# Patient Record
Sex: MALE | Race: WHITE | NOT HISPANIC OR LATINO | Employment: UNEMPLOYED | URBAN - METROPOLITAN AREA
[De-identification: names, ages, dates, MRNs, and addresses within clinical notes are randomized per-mention and may not be internally consistent; named-entity substitution may affect disease eponyms.]

---

## 2020-09-21 ENCOUNTER — OFFICE VISIT (OUTPATIENT)
Dept: URGENT CARE | Facility: CLINIC | Age: 55
End: 2020-09-21
Payer: MEDICAID

## 2020-09-21 VITALS
TEMPERATURE: 99 F | OXYGEN SATURATION: 96 % | WEIGHT: 225 LBS | RESPIRATION RATE: 18 BRPM | HEIGHT: 70 IN | BODY MASS INDEX: 32.21 KG/M2 | HEART RATE: 70 BPM | DIASTOLIC BLOOD PRESSURE: 61 MMHG | SYSTOLIC BLOOD PRESSURE: 100 MMHG

## 2020-09-21 DIAGNOSIS — U07.1 COVID-19 VIRUS DETECTED: ICD-10-CM

## 2020-09-21 DIAGNOSIS — U07.1 COVID-19: Primary | ICD-10-CM

## 2020-09-21 LAB
CTP QC/QA: YES
SARS-COV-2 RDRP RESP QL NAA+PROBE: POSITIVE

## 2020-09-21 PROCEDURE — U0002 COVID-19 LAB TEST NON-CDC: HCPCS | Mod: S$GLB,,, | Performed by: PHYSICIAN ASSISTANT

## 2020-09-21 PROCEDURE — U0002: ICD-10-PCS | Mod: S$GLB,,, | Performed by: PHYSICIAN ASSISTANT

## 2020-09-21 PROCEDURE — 99203 OFFICE O/P NEW LOW 30 MIN: CPT | Mod: S$GLB,,, | Performed by: PHYSICIAN ASSISTANT

## 2020-09-21 PROCEDURE — 99203 PR OFFICE/OUTPT VISIT, NEW, LEVL III, 30-44 MIN: ICD-10-PCS | Mod: S$GLB,,, | Performed by: PHYSICIAN ASSISTANT

## 2020-09-21 RX ORDER — ROSUVASTATIN CALCIUM 10 MG/1
10 TABLET, COATED ORAL DAILY
COMMUNITY

## 2020-09-21 RX ORDER — ASPIRIN 325 MG
325 TABLET ORAL DAILY
COMMUNITY

## 2020-09-21 RX ORDER — AMLODIPINE BESYLATE 10 MG/1
10 TABLET ORAL DAILY
COMMUNITY

## 2020-09-21 RX ORDER — METOPROLOL SUCCINATE 100 MG/1
100 TABLET, EXTENDED RELEASE ORAL DAILY
COMMUNITY

## 2020-09-21 RX ORDER — LISINOPRIL 10 MG/1
10 TABLET ORAL DAILY
COMMUNITY

## 2020-09-21 NOTE — PATIENT INSTRUCTIONS
Your test was POSITIVE for COVID-19 (coronavirus).       Please isolate yourself at home.  You may leave home and/or return to work once the following conditions are met:    If you were not hospitalized and are not severely immunocompromised*:   More than 10 days since symptoms first appeared AND   More than 24 hours fever free without medications AND   Symptoms have improved     If you were hospitalized OR are severely immunocompromised*:   More than 20 days since symptoms first appeared   More than 24 hrs hours fever free without medications   Symptoms have improved    If you had no symptoms but tested postivepositive:   More than 10 days since the date of the first positive test (20 days if severely immunocompromised).   If you develop symptoms, then use the guidelines above.     *Definition of severely immunocompromised:  - Current chemotherapy for cancer  - Untreated HIV with CD4 count less than 200  - Combined primary immunodeficiency disorder  - Prednisone more than 20 mg per day for more than 14 days  - Post-transplant patients    Additional instructions:   Separate yourself from other people and animals in your home.   Call ahead before visiting your doctor.   Wear a facemask when around others.   Cover your coughs and sneezes.   Wash your hands often with soap and water; hand  can be used, too.   Avoid sharing personal household items.   Wipe down surfaces used daily.   Monitor your symptoms. Seek prompt medical attention if your illness is worsening (e.g., difficulty breathing).    Before seeking care, call your healthcare provider.   If you have a medical emergency and need to call 911, notify the dispatch personnel that you have, or are being evaluated for COVID-19. If possible, put on a facemask before emergency medical services arrive.      Contact Tracing    As one of the next steps, you will receive a call or text from the [place] Department of Health ([facility]) COVID-19  Tracing Team. See the contact information below so you know not to ignore the health departments call. It is important that you contact them back immediately so they can help.      Contact Tracer Number:  [phone]  Caller ID for most carriers: [facility]     What is contact tracing?  · Contact tracing is a process that helps identify everyone who has been in close contact with an infected person. Contact tracers let those people know they may have been exposed and guide them on next steps. Confidentiality is important for everyone; no one will be told who may have exposed them to the virus.  · Your involvement is important. The more we know about where and how this virus is spreading, the better chance we have at stopping it from spreading further.  What does exposure mean?  · Exposure means you have been within 6 feet for more than 15 minutes with a person who has or had COVID-19.  What kind of questions do the contact tracers ask?  · A contact tracer will confirm your basic contact information including name, address, phone number, and next of kin, as well as asking about any symptoms you may have had. Theyll also ask you how you think you may have gotten sick, such as places where you may have been exposed to the virus, and people you were with. Those names will never be shared with anyone outside of that call, and will only be used to help trace and stop the spread of the virus.   I have privacy concerns. How will the state use my information?  · Your privacy about your health is important. All calls are completed using call centers that use the appropriate health privacy protection measures (HIPAA compliance), meaning that your patient information is safe. No one will ever ask you any questions related to immigration status. Your health comes first.   Do I have to participate?  · You do not have to participate, but we strongly encourage you to. Contact tracing can help us catch and control new  outbreaks as theyre developing to keep your friends and family safe.   What if I dont hear from anyone?  · If you dont receive a call within 24 hours, you can call the number above right away to inquire about your status. That line is open from 8:00 am - 8:00 p.m., 7 days a week.  Contact tracing saves lives! Together, we have the power to beat this virus and keep our loved ones and neighbors safe.    For more information see CDC link below.      https://www.cdc.gov/coronavirus/2019-ncov/hcp/guidance-prevent-spread.html#precautions        Sources:  [facility], [place] [facility] and [facility]           Sincerely,     Corina Mahmood PA-C

## 2020-09-21 NOTE — PROGRESS NOTES
"Subjective:       Patient ID: Umair Ascencio is a 55 y.o. male.    Vitals:  height is 5' 10" (1.778 m) and weight is 102.1 kg (225 lb). His temperature is 99.1 °F (37.3 °C). His blood pressure is 100/61 and his pulse is 70. His respiration is 18 and oxygen saturation is 96%.     Chief Complaint: URI    Patient reports congestion, fatigue, weakness and cough for the last week.     URI   This is a new problem. The problem has been unchanged. The maximum temperature recorded prior to his arrival was 100.4 - 100.9 F. Associated symptoms include coughing. Pertinent negatives include no congestion, ear pain, nausea, rash, sinus pain, sore throat, vomiting or wheezing. He has tried acetaminophen for the symptoms. The treatment provided no relief.       Constitution: Positive for chills, fatigue and fever. Negative for sweating.   HENT: Positive for sinus pressure. Negative for ear pain, congestion, sinus pain, sore throat and voice change.    Neck: Negative for painful lymph nodes.   Eyes: Negative for eye redness.   Respiratory: Positive for cough. Negative for chest tightness, sputum production, bloody sputum, COPD, shortness of breath, stridor, wheezing and asthma.    Gastrointestinal: Negative for nausea and vomiting.   Musculoskeletal: Negative for muscle ache.   Skin: Negative for rash.   Allergic/Immunologic: Negative for seasonal allergies and asthma.   Hematologic/Lymphatic: Negative for swollen lymph nodes.       Objective:      Physical Exam   Constitutional: He is oriented to person, place, and time. He appears well-developed. He is cooperative. He does not appear ill. No distress.   HENT:   Head: Normocephalic and atraumatic.   Ears:   Right Ear: External ear normal.   Left Ear: External ear normal.   Nose: Nose normal.   Mouth/Throat: Oropharynx is clear and moist.   Eyes: Conjunctivae, EOM and lids are normal.   Neck: Trachea normal, full passive range of motion without pain and phonation normal. Neck " supple.   Pulmonary/Chest: Effort normal. No respiratory distress.   Musculoskeletal: Normal range of motion.   Neurological: He is alert and oriented to person, place, and time.   Skin: Skin is warm, dry, intact and not diaphoretic. Psychiatric: His speech is normal and behavior is normal. Judgment and thought content normal.   Nursing note and vitals reviewed.        Assessment:       1. COVID-19        Office Visit on 09/21/2020   Component Date Value Ref Range Status    POC Rapid COVID 09/21/2020 Positive* Negative Final     Acceptable 09/21/2020 Yes   Final     Plan:       Entered room with PPE on.  Patient does not appear to be in respiratory distress.  And he denies shortness of breath.  ER precautions and quarantine precautions discussed.    COVID-19  -     POCT COVID-19 Rapid Screening      Patient Instructions   Your test was POSITIVE for COVID-19 (coronavirus).       Please isolate yourself at home.  You may leave home and/or return to work once the following conditions are met:    If you were not hospitalized and are not severely immunocompromised*:   More than 10 days since symptoms first appeared AND   More than 24 hours fever free without medications AND   Symptoms have improved     If you were hospitalized OR are severely immunocompromised*:   More than 20 days since symptoms first appeared   More than 24 hrs hours fever free without medications   Symptoms have improved    If you had no symptoms but tested postivepositive:   More than 10 days since the date of the first positive test (20 days if severely immunocompromised).   If you develop symptoms, then use the guidelines above.     *Definition of severely immunocompromised:  - Current chemotherapy for cancer  - Untreated HIV with CD4 count less than 200  - Combined primary immunodeficiency disorder  - Prednisone more than 20 mg per day for more than 14 days  - Post-transplant patients    Additional instructions:   Separate  yourself from other people and animals in your home.   Call ahead before visiting your doctor.   Wear a facemask when around others.   Cover your coughs and sneezes.   Wash your hands often with soap and water; hand  can be used, too.   Avoid sharing personal household items.   Wipe down surfaces used daily.   Monitor your symptoms. Seek prompt medical attention if your illness is worsening (e.g., difficulty breathing).    Before seeking care, call your healthcare provider.   If you have a medical emergency and need to call 911, notify the dispatch personnel that you have, or are being evaluated for COVID-19. If possible, put on a facemask before emergency medical services arrive.      Contact Tracing    As one of the next steps, you will receive a call or text from the [place] [facility] ([facility]) COVID-19 Tracing Team. See the contact information below so you know not to ignore the health departments call. It is important that you contact them back immediately so they can help.      Contact Tracer Number:  [phone]  Caller ID for most carriers: [facility]     What is contact tracing?  · Contact tracing is a process that helps identify everyone who has been in close contact with an infected person. Contact tracers let those people know they may have been exposed and guide them on next steps. Confidentiality is important for everyone; no one will be told who may have exposed them to the virus.  · Your involvement is important. The more we know about where and how this virus is spreading, the better chance we have at stopping it from spreading further.  What does exposure mean?  · Exposure means you have been within 6 feet for more than 15 minutes with a person who has or had COVID-19.  What kind of questions do the contact tracers ask?  · A contact tracer will confirm your basic contact information including name, address, phone number, and next of kin, as well as asking about  any symptoms you may have had. Theyll also ask you how you think you may have gotten sick, such as places where you may have been exposed to the virus, and people you were with. Those names will never be shared with anyone outside of that call, and will only be used to help trace and stop the spread of the virus.   I have privacy concerns. How will the state use my information?  · Your privacy about your health is important. All calls are completed using call centers that use the appropriate health privacy protection measures (HIPAA compliance), meaning that your patient information is safe. No one will ever ask you any questions related to immigration status. Your health comes first.   Do I have to participate?  · You do not have to participate, but we strongly encourage you to. Contact tracing can help us catch and control new outbreaks as theyre developing to keep your friends and family safe.   What if I dont hear from anyone?  · If you dont receive a call within 24 hours, you can call the number above right away to inquire about your status. That line is open from 8:00 am - 8:00 p.m., 7 days a week.  Contact tracing saves lives! Together, we have the power to beat this virus and keep our loved ones and neighbors safe.    For more information see CDC link below.      https://www.cdc.gov/coronavirus/2019-ncov/hcp/guidance-prevent-spread.html#precautions        Sources:  [facility], [place] [facility] and [facility]           Sincerely,     Corina Mahmood PA-C

## 2020-12-02 ENCOUNTER — OFFICE VISIT (OUTPATIENT)
Dept: UROLOGY | Facility: CLINIC | Age: 55
End: 2020-12-02
Payer: MEDICAID

## 2020-12-02 VITALS
RESPIRATION RATE: 18 BRPM | DIASTOLIC BLOOD PRESSURE: 78 MMHG | WEIGHT: 240 LBS | OXYGEN SATURATION: 98 % | TEMPERATURE: 98 F | SYSTOLIC BLOOD PRESSURE: 130 MMHG | HEIGHT: 70 IN | HEART RATE: 64 BPM | BODY MASS INDEX: 34.36 KG/M2

## 2020-12-02 DIAGNOSIS — R53.82 CHRONIC FATIGUE: ICD-10-CM

## 2020-12-02 DIAGNOSIS — R39.198 SLOW URINARY STREAM: ICD-10-CM

## 2020-12-02 DIAGNOSIS — R63.5 UNEXPLAINED WEIGHT GAIN: ICD-10-CM

## 2020-12-02 DIAGNOSIS — R79.89 LOW TESTOSTERONE IN MALE: ICD-10-CM

## 2020-12-02 PROCEDURE — 99999 PR PBB SHADOW E&M-EST. PATIENT-LVL III: CPT | Mod: PBBFAC,,, | Performed by: UROLOGY

## 2020-12-02 PROCEDURE — 99999 PR PBB SHADOW E&M-EST. PATIENT-LVL III: ICD-10-PCS | Mod: PBBFAC,,, | Performed by: UROLOGY

## 2020-12-02 PROCEDURE — 99204 OFFICE O/P NEW MOD 45 MIN: CPT | Mod: S$PBB,,, | Performed by: UROLOGY

## 2020-12-02 PROCEDURE — 99204 PR OFFICE/OUTPT VISIT, NEW, LEVL IV, 45-59 MIN: ICD-10-PCS | Mod: S$PBB,,, | Performed by: UROLOGY

## 2020-12-02 PROCEDURE — 99213 OFFICE O/P EST LOW 20 MIN: CPT | Mod: PBBFAC,PO | Performed by: UROLOGY

## 2020-12-02 NOTE — PROGRESS NOTES
Subjective:       Patient ID: Umair Ascencio is a 55 y.o. male.    Chief Complaint: No chief complaint on file.    Mr. Ascencio presents the office with referral from Dr. Mares for treatment of low testosterone.  The patient had low testosterone levels in Dr. Mares office and he was referred for evaluation and treatment.  He has had an increasing fatigue an unexplained weight gain even though he has been exercising daily.  He did have a history of emergency open-heart surgery approximately 3 years ago after angioplasty and attempted stent placement cause disruption of coronary vessel.  Patient has done well since then but is now starting to gain weight and have trouble with energy even though he is exercising and eating right daily.    Other  This is a chronic problem. The current episode started more than 1 year ago. The problem occurs constantly. The problem has been gradually worsening. Associated symptoms include fatigue. Pertinent negatives include no abdominal pain, anorexia, arthralgias, change in bowel habit, chest pain, chills, congestion, coughing, diaphoresis, fever, headaches, joint swelling, myalgias, nausea, neck pain, numbness, rash, sore throat, swollen glands, urinary symptoms, vertigo, visual change, vomiting or weakness. Nothing aggravates the symptoms. He has tried nothing for the symptoms.     Review of Systems   Constitutional: Positive for fatigue and unexpected weight change. Negative for activity change, appetite change, chills, diaphoresis and fever.   HENT: Negative for congestion, hearing loss, sinus pressure, sore throat and trouble swallowing.    Eyes: Negative for photophobia, pain, discharge and visual disturbance.   Respiratory: Negative for apnea, cough and shortness of breath.    Cardiovascular: Negative for chest pain, palpitations and leg swelling.   Gastrointestinal: Negative for abdominal distention, abdominal pain, anal bleeding, anorexia, blood in stool, change in bowel habit,  constipation, diarrhea, nausea, rectal pain and vomiting.   Endocrine: Negative for cold intolerance, heat intolerance, polydipsia, polyphagia and polyuria.   Genitourinary: Negative for decreased urine volume, difficulty urinating, discharge, dysuria, enuresis, flank pain, frequency, genital sores, hematuria, penile pain, penile swelling, scrotal swelling, testicular pain and urgency.   Musculoskeletal: Negative for arthralgias, back pain, joint swelling, myalgias and neck pain.   Skin: Negative for color change, pallor, rash and wound.   Allergic/Immunologic: Negative for environmental allergies, food allergies and immunocompromised state.   Neurological: Negative for dizziness, vertigo, seizures, weakness, numbness and headaches.   Hematological: Negative for adenopathy. Does not bruise/bleed easily.   Psychiatric/Behavioral: Negative.        Objective:      Physical Exam  Vitals signs and nursing note reviewed.   Constitutional:       General: He is not in acute distress.     Appearance: Normal appearance. He is well-developed. He is obese. He is not ill-appearing, toxic-appearing or diaphoretic.   HENT:      Head: Normocephalic and atraumatic.      Right Ear: There is no impacted cerumen.      Left Ear: There is no impacted cerumen.      Nose: Nose normal.      Mouth/Throat:      Mouth: Mucous membranes are moist.      Pharynx: Oropharynx is clear. No oropharyngeal exudate or posterior oropharyngeal erythema.   Eyes:      General: No scleral icterus.        Right eye: No discharge.         Left eye: No discharge.      Extraocular Movements: Extraocular movements intact.      Conjunctiva/sclera: Conjunctivae normal.      Pupils: Pupils are equal, round, and reactive to light.   Neck:      Musculoskeletal: Normal range of motion and neck supple.   Cardiovascular:      Rate and Rhythm: Normal rate and regular rhythm.      Heart sounds: Normal heart sounds. No murmur. No friction rub. No gallop.    Pulmonary:       Effort: Pulmonary effort is normal.      Breath sounds: Normal breath sounds.   Abdominal:      General: Bowel sounds are normal. There is no distension.      Palpations: Abdomen is soft. There is no mass.      Tenderness: There is no abdominal tenderness. There is no right CVA tenderness, left CVA tenderness, guarding or rebound.      Hernia: No hernia is present.   Genitourinary:     Penis: Normal.    Musculoskeletal: Normal range of motion.         General: No swelling, tenderness, deformity or signs of injury.      Right lower leg: No edema.      Left lower leg: No edema.   Skin:     General: Skin is warm and dry.      Capillary Refill: Capillary refill takes less than 2 seconds.      Coloration: Skin is not jaundiced or pale.      Findings: No bruising, erythema, lesion or rash.   Neurological:      Mental Status: He is alert and oriented to person, place, and time.      Cranial Nerves: No cranial nerve deficit.      Sensory: No sensory deficit.      Motor: No weakness.      Coordination: Coordination normal.      Gait: Gait normal.      Deep Tendon Reflexes: Reflexes are normal and symmetric. Reflexes normal.   Psychiatric:         Mood and Affect: Mood normal.         Behavior: Behavior normal.         Thought Content: Thought content normal.         Judgment: Judgment normal.         Assessment:       1. Chronic fatigue    2. Unexplained weight gain    3. Low testosterone in male    4. Slow urinary stream        Plan:           Patient Instructions   Check PSA, testosterone and estrogen level in the morning.  Call results  Treat appropriately once labs were performed and notify patient of medications.  Repeat labs in 3 months as required if treatment is initiated and follow-up in 3 months to evaluate affect.

## 2020-12-02 NOTE — LETTER
December 2, 2020      Jaquan Mares MD  [address] [place] [id number]           [facility] - Urology - [address] [address]  Phone: [phone]  Fax: [phone]          Patient: Umair Ascencio   MR Number: [id number]   YOB: 1965   Date of Visit: 12/2/2020       Dear Dr. Jaquan Mares:    Thank you for referring Umair Ascencio to me for evaluation. Attached you will find relevant portions of my assessment and plan of care.    If you have questions, please do not hesitate to call me. I look forward to following Umair Ascencio along with you.    Please for me copies of all testosterone and PSA labs available.    Sincerely,    Ravi Guzman MD    Enclosure  CC:  No Recipients    If you would like to receive this communication electronically, please contact [email or web address] or [phone] to request more information on [facility] Link access.    For providers and/or their staff who would like to refer a patient to [facility], please contact us through our one-stop-shop provider referral line, [facility], at [phone].    If you feel you have received this communication in error or would no longer like to receive these types of communications, please e-mail [email or web address]

## 2020-12-02 NOTE — PATIENT INSTRUCTIONS
Check PSA, testosterone and estrogen level in the morning.  Call results  Treat appropriately once labs were performed and notify patient of medications.  Repeat labs in 3 months as required if treatment is initiated and follow-up in 3 months to evaluate affect.

## 2020-12-21 ENCOUNTER — PATIENT MESSAGE (OUTPATIENT)
Dept: UROLOGY | Facility: CLINIC | Age: 55
End: 2020-12-21

## 2020-12-22 ENCOUNTER — PATIENT MESSAGE (OUTPATIENT)
Dept: UROLOGY | Facility: CLINIC | Age: 55
End: 2020-12-22

## 2021-03-02 ENCOUNTER — OFFICE VISIT (OUTPATIENT)
Dept: UROLOGY | Facility: CLINIC | Age: 56
End: 2021-03-02
Payer: MEDICAID

## 2021-03-02 VITALS
SYSTOLIC BLOOD PRESSURE: 148 MMHG | HEART RATE: 84 BPM | BODY MASS INDEX: 34.4 KG/M2 | DIASTOLIC BLOOD PRESSURE: 94 MMHG | HEIGHT: 70 IN | WEIGHT: 240.31 LBS

## 2021-03-02 DIAGNOSIS — R53.82 CHRONIC FATIGUE: ICD-10-CM

## 2021-03-02 DIAGNOSIS — R79.89 LOW TESTOSTERONE IN MALE: Primary | ICD-10-CM

## 2021-03-02 DIAGNOSIS — R86.1 ABNORMAL LEVEL OF HORMONES IN SPECIMENS FROM MALE GENITAL ORGANS: ICD-10-CM

## 2021-03-02 PROCEDURE — 99999 PR PBB SHADOW E&M-EST. PATIENT-LVL III: ICD-10-PCS | Mod: PBBFAC,,, | Performed by: UROLOGY

## 2021-03-02 PROCEDURE — 99213 OFFICE O/P EST LOW 20 MIN: CPT | Mod: PBBFAC,PO | Performed by: UROLOGY

## 2021-03-02 PROCEDURE — 99999 PR PBB SHADOW E&M-EST. PATIENT-LVL III: CPT | Mod: PBBFAC,,, | Performed by: UROLOGY

## 2021-03-02 PROCEDURE — 99215 PR OFFICE/OUTPT VISIT, EST, LEVL V, 40-54 MIN: ICD-10-PCS | Mod: S$PBB,,, | Performed by: UROLOGY

## 2021-03-02 PROCEDURE — 99215 OFFICE O/P EST HI 40 MIN: CPT | Mod: S$PBB,,, | Performed by: UROLOGY

## 2021-03-02 RX ORDER — TESTOSTERONE 30 MG/1.5ML
60 SOLUTION TOPICAL DAILY
Qty: 2 BOTTLE | Refills: 5 | Status: SHIPPED | OUTPATIENT
Start: 2021-03-02 | End: 2023-08-10

## 2021-03-17 ENCOUNTER — TELEPHONE (OUTPATIENT)
Dept: PHARMACY | Facility: CLINIC | Age: 56
End: 2021-03-17

## 2021-04-15 ENCOUNTER — PATIENT MESSAGE (OUTPATIENT)
Dept: RESEARCH | Facility: HOSPITAL | Age: 56
End: 2021-04-15

## 2022-02-16 ENCOUNTER — PATIENT MESSAGE (OUTPATIENT)
Dept: RESEARCH | Facility: HOSPITAL | Age: 57
End: 2022-02-16
Payer: MEDICAID

## 2023-08-03 ENCOUNTER — OFFICE VISIT (OUTPATIENT)
Dept: UROLOGY | Facility: CLINIC | Age: 58
End: 2023-08-03
Payer: COMMERCIAL

## 2023-08-03 VITALS
HEIGHT: 70 IN | SYSTOLIC BLOOD PRESSURE: 86 MMHG | DIASTOLIC BLOOD PRESSURE: 57 MMHG | BODY MASS INDEX: 31.55 KG/M2 | HEART RATE: 54 BPM | WEIGHT: 220.38 LBS

## 2023-08-03 DIAGNOSIS — R79.89 LOW TESTOSTERONE IN MALE: Primary | ICD-10-CM

## 2023-08-03 PROCEDURE — 99214 OFFICE O/P EST MOD 30 MIN: CPT | Mod: S$GLB,,, | Performed by: UROLOGY

## 2023-08-03 PROCEDURE — 1159F MED LIST DOCD IN RCRD: CPT | Mod: CPTII,S$GLB,, | Performed by: UROLOGY

## 2023-08-03 PROCEDURE — 3074F SYST BP LT 130 MM HG: CPT | Mod: CPTII,S$GLB,, | Performed by: UROLOGY

## 2023-08-03 PROCEDURE — 3074F PR MOST RECENT SYSTOLIC BLOOD PRESSURE < 130 MM HG: ICD-10-PCS | Mod: CPTII,S$GLB,, | Performed by: UROLOGY

## 2023-08-03 PROCEDURE — 4010F ACE/ARB THERAPY RXD/TAKEN: CPT | Mod: CPTII,S$GLB,, | Performed by: UROLOGY

## 2023-08-03 PROCEDURE — 3078F DIAST BP <80 MM HG: CPT | Mod: CPTII,S$GLB,, | Performed by: UROLOGY

## 2023-08-03 PROCEDURE — 99999 PR PBB SHADOW E&M-EST. PATIENT-LVL III: CPT | Mod: PBBFAC,,, | Performed by: UROLOGY

## 2023-08-03 PROCEDURE — 3008F BODY MASS INDEX DOCD: CPT | Mod: CPTII,S$GLB,, | Performed by: UROLOGY

## 2023-08-03 PROCEDURE — 99214 PR OFFICE/OUTPT VISIT, EST, LEVL IV, 30-39 MIN: ICD-10-PCS | Mod: S$GLB,,, | Performed by: UROLOGY

## 2023-08-03 PROCEDURE — 3078F PR MOST RECENT DIASTOLIC BLOOD PRESSURE < 80 MM HG: ICD-10-PCS | Mod: CPTII,S$GLB,, | Performed by: UROLOGY

## 2023-08-03 PROCEDURE — 3008F PR BODY MASS INDEX (BMI) DOCUMENTED: ICD-10-PCS | Mod: CPTII,S$GLB,, | Performed by: UROLOGY

## 2023-08-03 PROCEDURE — 1159F PR MEDICATION LIST DOCUMENTED IN MEDICAL RECORD: ICD-10-PCS | Mod: CPTII,S$GLB,, | Performed by: UROLOGY

## 2023-08-03 PROCEDURE — 4010F PR ACE/ARB THEARPY RXD/TAKEN: ICD-10-PCS | Mod: CPTII,S$GLB,, | Performed by: UROLOGY

## 2023-08-03 PROCEDURE — 99999 PR PBB SHADOW E&M-EST. PATIENT-LVL III: ICD-10-PCS | Mod: PBBFAC,,, | Performed by: UROLOGY

## 2023-08-03 NOTE — PROGRESS NOTES
[facility]rology   Clinic Note    SUBJECTIVE:     Chief Complaint   Patient presents with    Benign Prostatic Hypertrophy       Referral from: No ref. provider found.    History of Present Illness:  Umair Ascencio is a 58 y.o. male who presents to clinic for evaluation for hypogonadism, low testosterone.    Patient is here for evaluation of hypogonadism.  He reports that he has low energy and low libido.  This has been persistent for some time.  He also reports erectile dysfunction.  He had low testosterone drawn by his PCP however these labs are not available in our system.  He desires testosterone replacement therapy.  He denies a personal family history of prostate cancer.     Patient endorses no additional complaints at this time.    History reviewed. No pertinent past medical history.    History reviewed. No pertinent surgical history.    History reviewed. No pertinent family history.    Social History     Tobacco Use    Smoking status: Never    Smokeless tobacco: Never   Substance Use Topics    Alcohol use: Not Currently    Drug use: Not Currently       Current Outpatient Medications on File Prior to Visit   Medication Sig Dispense Refill    amLODIPine (NORVASC) 10 MG tablet Take 10 mg by mouth once daily.      aspirin 325 MG tablet Take 325 mg by mouth once daily.      lisinopriL 10 MG tablet Take 10 mg by mouth once daily.      metoprolol succinate (TOPROL-XL) 100 MG 24 hr tablet Take 100 mg by mouth once daily.      rosuvastatin (CRESTOR) 10 MG tablet Take 10 mg by mouth once daily.      testosterone (AXIRON) 30 mg/actuation (1.5 mL) SlPm Place 60 mg onto the skin once daily. 2 Bottle 5    testosterone (AXIRON) 30 mg/actuation (1.5 mL) SlPm Place 60 mg (2 pumps) onto the skin once daily. 180 mL 5     No current facility-administered medications on file prior to visit.       Review of patient's allergies indicates:  No Known Allergies    Review of Systems:  A review of 10+ systems was conducted with  "pertinent positive and negative findings documented in HPI with all other systems reviewed and negative.    OBJECTIVE:     Estimated body mass index is 31.62 kg/m² as calculated from the following:    Height as of this encounter: 5' 10" (1.778 m).    Weight as of this encounter: 100 kg (220 lb 6.4 oz).    Vital Signs (Most Recent)  Vitals:    08/03/23 1337   BP: (!) 86/57   Pulse: (!) 54       Physical Exam:  GENERAL: patient sitting comfortably  HEENT: normocephalic  NECK: supple, no JVD  PULM: normal chest rise, no increased WOB  HEART: non-diaphoretic  ABDO: soft, nondistended, nontender  BACK: no CVA tenderness bilaterally  SKIN: warm, dry, well perfused  EXT: no bruising or edema  NEURO: grossly normal with no focal deficits  PSYCH: appropriate mood and affect    Genitourinary Exam:  deferred    Lab Results   Component Value Date    BUN 13 03/09/2018    CREATININE 1.00 03/09/2018    WBC 6.22 03/09/2018    HGB 18.0 03/09/2018    HCT 52.8 03/09/2018     03/09/2018    AST 24 03/09/2018    ALT 30 03/09/2018    ALKPHOS 88 03/09/2018    ALBUMIN 4.5 03/09/2018        Imaging:  I have personally reviewed all relevant imaging studies.    No results found for this or any previous visit (from the past 2160 hour(s)).  No results found for this or any previous visit (from the past 2160 hour(s)).  No image results found.       PSA:  No results found for: "PSA", "PSADIAG", "PSATOTAL", "PSAFREE"    Testosterone:  No results found for: "TOTALTESTOST", "TESTOSTERONE"     ASSESSMENT     1. Low testosterone in male        PLAN:     Hypogonadism in a Male (Low Testosterone)    - We discussed the diagnosis of testosterone deficiency, as below 300 ng/dL on two early morning testosterone blood draws. Patient is below this level. We discussed adjunctive lab testing, including LH, PSA, Hgb/Hct. We discussed the risks and benefits of testosterone replacement therapy. We discussed that if trying to conceive, patient should not be " started on exogenous testosterone therapy. Alternative treatments and adjunctive lab testing were discussed. All questions were answered.    - we will obtain labs to confirm that his testosterone is indeed low.  If it is low we will replace with intramuscular testosterone.  I will transition him to the care of the nurse practitioner when she starts in October.    Chris Garcia MD  [facility]  [facility]  [facility]    Disclaimer: This note has been generated using voice-recognition software. There may be typographical errors that have been missed during proof-reading.

## 2023-08-06 DIAGNOSIS — R79.89 LOW TESTOSTERONE IN MALE: Primary | ICD-10-CM

## 2023-08-10 DIAGNOSIS — E29.1 HYPOGONADISM IN MALE: Primary | ICD-10-CM

## 2023-08-10 RX ORDER — TESTOSTERONE CYPIONATE 200 MG/ML
200 INJECTION, SOLUTION INTRAMUSCULAR
Qty: 13 ML | Refills: 0 | Status: SHIPPED | OUTPATIENT
Start: 2023-08-10 | End: 2023-08-14 | Stop reason: SDUPTHER

## 2023-08-14 ENCOUNTER — PATIENT MESSAGE (OUTPATIENT)
Dept: UROLOGY | Facility: CLINIC | Age: 58
End: 2023-08-14
Payer: COMMERCIAL

## 2023-08-14 DIAGNOSIS — E29.1 HYPOGONADISM IN MALE: ICD-10-CM

## 2023-08-14 RX ORDER — TESTOSTERONE CYPIONATE 200 MG/ML
200 INJECTION, SOLUTION INTRAMUSCULAR
Qty: 13 ML | Refills: 0 | Status: SHIPPED | OUTPATIENT
Start: 2023-08-14 | End: 2024-02-15 | Stop reason: SDUPTHER

## 2023-10-09 ENCOUNTER — TELEPHONE (OUTPATIENT)
Dept: UROLOGY | Facility: CLINIC | Age: 58
End: 2023-10-09
Payer: COMMERCIAL

## 2023-10-09 ENCOUNTER — NURSE TRIAGE (OUTPATIENT)
Dept: ADMINISTRATIVE | Facility: CLINIC | Age: 58
End: 2023-10-09
Payer: COMMERCIAL

## 2023-10-09 NOTE — TELEPHONE ENCOUNTER
Pt calling states he has been on testosterone replacement for about 2 months now, last Wednesday he took some fluid pills then worked out and was dizzy and vomited. Was told to increase fluid intake by cardiologist. States he improved but is still lightheaded when he gets up. Per protocol advised to GO TO OFFICE NOW. verbalized understanding, states he will call his providers. Advised if unable to get in then to go to [facility]. verbalized understanding. Denies any further questions or concerns at this time, advised to call back if they have any that come up. Advised pt to call back with any other concerns or worsening symptoms. Verbalized understanding and will route message to provider.     Reason for Disposition   Lightheadedness (dizziness) present now, after 2 hours of rest and fluids    Additional Information   Negative: SEVERE difficulty breathing (e.g., struggling for each breath, speaks in single words)   Negative: Shock suspected (e.g., cold/pale/clammy skin, too weak to stand, low BP, rapid pulse)   Negative: Difficult to awaken or acting confused (e.g., disoriented, slurred speech)   Negative: Fainted, and still feels dizzy afterwards   Negative: Overdose (accidental or intentional) of medications   Negative: New neurologic deficit that is present now: * Weakness of the face, arm, or leg on one side of the body * Numbness of the face, arm, or leg on one side of the body * Loss of speech or garbled speech   Negative: Heart beating < 50 beats per minute OR > 140 beats per minute   Negative: Sounds like a life-threatening emergency to the triager   Negative: SEVERE dizziness (e.g., unable to stand, requires support to walk, feels like passing out now)   Negative: SEVERE headache or neck pain   Negative: Spinning or tilting sensation (vertigo) present now and one or more stroke risk factors (i.e., hypertension, diabetes mellitus, prior stroke/TIA, heart attack, age over 60) (Exception: Prior physician evaluation  for this AND no different/worse than usual.)   Negative: Neurologic deficit that was brief (now gone), ANY of the following:* Weakness of the face, arm, or leg on one side of the body* Numbness of the face, arm, or leg on one side of the body* Loss of speech or garbled speech   Negative: Loss of vision or double vision  (Exception: Similar to previous migraines.)   Negative: Extra heartbeats, irregular heart beating, or heart is beating very fast (i.e., 'palpitations')   Negative: Difficulty breathing   Negative: Drinking very little and dehydration suspected (e.g., no urine > 12 hours, very dry mouth, very lightheaded)   Negative: Follows bleeding (e.g., stomach, rectum, vagina)  (Exception: Became dizzy from sight of small amount blood.)   Negative: Patient sounds very sick or weak to the triager    Protocols used: Dizziness-A-OH

## 2023-10-09 NOTE — TELEPHONE ENCOUNTER
Pt stated that he is still a little light headed but has an appt today for ENT to check and see if there is anything causing the light headedness. I voiced my understanding and advised to pt to reach back out if symptoms continued. Pt confirmed.

## 2023-12-12 DIAGNOSIS — E29.1 HYPOGONADISM IN MALE: ICD-10-CM

## 2023-12-12 RX ORDER — TESTOSTERONE CYPIONATE 200 MG/ML
200 INJECTION, SOLUTION INTRAMUSCULAR
Qty: 13 ML | Refills: 0 | Status: CANCELLED | OUTPATIENT
Start: 2023-12-12 | End: 2024-06-11

## 2024-01-25 ENCOUNTER — TELEPHONE (OUTPATIENT)
Dept: UROLOGY | Facility: CLINIC | Age: 59
End: 2024-01-25
Payer: COMMERCIAL

## 2024-01-25 NOTE — TELEPHONE ENCOUNTER
Left vm for pt stating upcoming appt has been rescheduled on 2/8 due to provider being out of office. Call back number left and new appointment letter sent out.

## 2024-02-15 ENCOUNTER — OFFICE VISIT (OUTPATIENT)
Dept: UROLOGY | Facility: CLINIC | Age: 59
End: 2024-02-15
Payer: COMMERCIAL

## 2024-02-15 VITALS
HEIGHT: 70 IN | HEART RATE: 58 BPM | BODY MASS INDEX: 34.69 KG/M2 | DIASTOLIC BLOOD PRESSURE: 85 MMHG | WEIGHT: 242.31 LBS | SYSTOLIC BLOOD PRESSURE: 149 MMHG

## 2024-02-15 DIAGNOSIS — R79.89 LOW TESTOSTERONE IN MALE: ICD-10-CM

## 2024-02-15 DIAGNOSIS — E29.1 HYPOGONADISM IN MALE: Primary | ICD-10-CM

## 2024-02-15 PROCEDURE — 99999 PR PBB SHADOW E&M-EST. PATIENT-LVL III: CPT | Mod: PBBFAC,,, | Performed by: UROLOGY

## 2024-02-15 PROCEDURE — 1159F MED LIST DOCD IN RCRD: CPT | Mod: CPTII,S$GLB,, | Performed by: UROLOGY

## 2024-02-15 PROCEDURE — 3008F BODY MASS INDEX DOCD: CPT | Mod: CPTII,S$GLB,, | Performed by: UROLOGY

## 2024-02-15 PROCEDURE — 4010F ACE/ARB THERAPY RXD/TAKEN: CPT | Mod: CPTII,S$GLB,, | Performed by: UROLOGY

## 2024-02-15 PROCEDURE — 3079F DIAST BP 80-89 MM HG: CPT | Mod: CPTII,S$GLB,, | Performed by: UROLOGY

## 2024-02-15 PROCEDURE — 3077F SYST BP >= 140 MM HG: CPT | Mod: CPTII,S$GLB,, | Performed by: UROLOGY

## 2024-02-15 PROCEDURE — 99214 OFFICE O/P EST MOD 30 MIN: CPT | Mod: S$GLB,,, | Performed by: UROLOGY

## 2024-02-15 RX ORDER — TESTOSTERONE CYPIONATE 200 MG/ML
200 INJECTION, SOLUTION INTRAMUSCULAR
Qty: 13 ML | Refills: 0 | Status: SHIPPED | OUTPATIENT
Start: 2024-02-15 | End: 2024-08-15

## 2024-02-15 NOTE — PROGRESS NOTES
[place] - Urology   Clinic Note    SUBJECTIVE:     Chief Complaint   Patient presents with    Other     6mth follow up  Low T        Referral from: No ref. provider found.    History of Present Illness:  Umair Ascencio is a 59 y.o. male who presents to clinic for evaluation for hypogonadism, low testosterone.    Patient is here for evaluation of hypogonadism.  He reports that he has low energy and low libido.  This has been persistent for some time.  He also reports erectile dysfunction.  He had low testosterone drawn by his PCP however these labs are not available in our system.  He desires testosterone replacement therapy.  He denies a personal family history of prostate cancer.     02/15/2024  Here for f/u  Somewhat improved, energy still not as he would like  T >500 now  No Hct drawn    Patient endorses no additional complaints at this time.    History reviewed. No pertinent past medical history.    History reviewed. No pertinent surgical history.    History reviewed. No pertinent family history.    Social History     Tobacco Use    Smoking status: Never    Smokeless tobacco: Never   Substance Use Topics    Alcohol use: Not Currently    Drug use: Not Currently       Current Outpatient Medications on File Prior to Visit   Medication Sig Dispense Refill    amLODIPine (NORVASC) 10 MG tablet Take 10 mg by mouth once daily.      aspirin 325 MG tablet Take 325 mg by mouth once daily.      lisinopriL 10 MG tablet Take 10 mg by mouth once daily.      metoprolol succinate (TOPROL-XL) 100 MG 24 hr tablet Take 100 mg by mouth once daily.      rosuvastatin (CRESTOR) 10 MG tablet Take 10 mg by mouth once daily.      [DISCONTINUED] testosterone cypionate (DEPOTESTOTERONE CYPIONATE) 200 mg/mL injection Inject 1 mL (200 mg total) into the muscle every 14 (fourteen) days. 13 mL 0     No current facility-administered medications on file prior to visit.       Review of patient's allergies indicates:  No Known  "Allergies    Review of Systems:  A review of 10+ systems was conducted with pertinent positive and negative findings documented in HPI with all other systems reviewed and negative.    OBJECTIVE:     Estimated body mass index is 34.76 kg/m² as calculated from the following:    Height as of this encounter: 5' 10" (1.778 m).    Weight as of this encounter: 109.9 kg (242 lb 4.6 oz).    Vital Signs (Most Recent)  Vitals:    02/15/24 0842   BP: (!) 149/85   Pulse: (!) 58       Physical Exam:  GENERAL: patient sitting comfortably  HEENT: normocephalic  NECK: supple, no JVD  PULM: normal chest rise, no increased WOB  HEART: non-diaphoretic  ABDO: soft, nondistended, nontender  BACK: no CVA tenderness bilaterally  SKIN: warm, dry, well perfused  EXT: no bruising or edema  NEURO: grossly normal with no focal deficits  PSYCH: appropriate mood and affect    Genitourinary Exam:  deferred    Lab Results   Component Value Date    BUN 27 (H) 08/04/2023    CREATININE 1.12 08/04/2023    WBC 7.81 08/04/2023    HGB 15.3 08/04/2023    HCT 43.8 08/04/2023     08/04/2023    AST 25 08/04/2023    ALT 31 08/04/2023    ALKPHOS 85 08/04/2023    ALBUMIN 4.4 08/04/2023        Imaging:  I have personally reviewed all relevant imaging studies.    No results found for this or any previous visit (from the past 2160 hour(s)).  No results found for this or any previous visit (from the past 2160 hour(s)).  No image results found.       PSA:  Lab Results   Component Value Date    PSADIAG 0.64 08/09/2023    PSATOTAL 1.1 01/02/2024    PSAFREE 0.22 01/02/2024       Testosterone:  Lab Results   Component Value Date    TOTALTESTOST 540 01/02/2024        ASSESSMENT     1. Hypogonadism in male    2. Low testosterone in male        PLAN:     Hypogonadism in a Male (Low Testosterone)    - Needs Hct today  - T refilled  - F/u NP in 3 mo    Chris Garcia MD  Urology  [facility]    Disclaimer: This note has been generated using " voice-recognition software. There may be typographical errors that have been missed during proof-reading.

## 2024-05-16 NOTE — PROGRESS NOTES
Subjective:       Patient ID: Umair Ascencio is a 59 y.o. male.    Chief Complaint: TRT     This is a 59 y.o.  male patient that is new to me but not new to the system.  This is a patient of Dr. Garcia that is now establishing care with me for TRT and ED. Reports low energy and decreased libido. Testosterone cypionate 1ml q14 days has been improving his symptoms. Reports that his energy level is better as well as his libido but he finds that his symptoms are worse towards the end of the 14 days.      Lab Results   Component Value Date    TOTALTESTOST 540 01/02/2024       Lab Results   Component Value Date    BUN 18 02/15/2024    CREATININE 0.95 02/15/2024    WBC 6.90 02/15/2024    HGB 18.3 (H) 02/15/2024    HCT 53.8 02/15/2024     (L) 02/15/2024    AST 25 08/04/2023    ALT 31 08/04/2023    ALKPHOS 85 08/04/2023    ALBUMIN 4.4 08/04/2023       LAST PSA  Lab Results   Component Value Date    PSADIAG 0.64 08/09/2023    PSATOTAL 1.1 01/02/2024    PSAFREE 0.22 01/02/2024       Lab Results   Component Value Date    CREATININE 0.95 02/15/2024       ---  PMH/PSH/Medications/Allergies/Social history reviewed and as in chart.    Review of Systems    Objective:      Physical Exam    Assessment:     Problem Noted   Hypogonadism in Male 5/17/2024       Plan:     Labs ordered for today: PSA, CBC, total testosterone.  Will schedule 6 month labs prior to next visit.  Start injecting 0.5ml q7days to help with symptoms.  Follow-up 6 months with labs prior.      NIRAJ Suresh    I spent a total of 25 minutes on the day of the visit.This includes face to face time and non-face to face time preparing to see the patient (eg, review of tests), obtaining and/or reviewing separately obtained history, documenting clinical information in the electronic or other health record, independently interpreting results and communicating results to the patient/family/caregiver, or care coordinator.

## 2024-05-17 ENCOUNTER — OFFICE VISIT (OUTPATIENT)
Dept: UROLOGY | Facility: CLINIC | Age: 59
End: 2024-05-17
Payer: COMMERCIAL

## 2024-05-17 VITALS
DIASTOLIC BLOOD PRESSURE: 80 MMHG | SYSTOLIC BLOOD PRESSURE: 134 MMHG | BODY MASS INDEX: 31.86 KG/M2 | WEIGHT: 222.56 LBS | HEART RATE: 69 BPM | HEIGHT: 70 IN

## 2024-05-17 DIAGNOSIS — E29.1 HYPOGONADISM IN MALE: Primary | ICD-10-CM

## 2024-05-17 DIAGNOSIS — R79.89 LOW TESTOSTERONE IN MALE: ICD-10-CM

## 2024-05-17 PROCEDURE — 3008F BODY MASS INDEX DOCD: CPT | Mod: CPTII,S$GLB,,

## 2024-05-17 PROCEDURE — 1160F RVW MEDS BY RX/DR IN RCRD: CPT | Mod: CPTII,S$GLB,,

## 2024-05-17 PROCEDURE — 99999 PR PBB SHADOW E&M-EST. PATIENT-LVL III: CPT | Mod: PBBFAC,,,

## 2024-05-17 PROCEDURE — 1159F MED LIST DOCD IN RCRD: CPT | Mod: CPTII,S$GLB,,

## 2024-05-17 PROCEDURE — 4010F ACE/ARB THERAPY RXD/TAKEN: CPT | Mod: CPTII,S$GLB,,

## 2024-05-17 PROCEDURE — 99213 OFFICE O/P EST LOW 20 MIN: CPT | Mod: S$GLB,,,

## 2024-05-17 PROCEDURE — 3079F DIAST BP 80-89 MM HG: CPT | Mod: CPTII,S$GLB,,

## 2024-05-17 PROCEDURE — 3075F SYST BP GE 130 - 139MM HG: CPT | Mod: CPTII,S$GLB,,

## 2024-05-17 RX ORDER — TADALAFIL 20 MG/1
TABLET ORAL
COMMUNITY
Start: 2024-03-25

## 2024-05-17 RX ORDER — LISINOPRIL 40 MG/1
40 TABLET ORAL
COMMUNITY
Start: 2024-05-15

## 2024-05-17 RX ORDER — TIRZEPATIDE 2.5 MG/.5ML
INJECTION, SOLUTION SUBCUTANEOUS
COMMUNITY
Start: 2024-03-06

## 2024-05-17 RX ORDER — ROSUVASTATIN CALCIUM 40 MG/1
40 TABLET, COATED ORAL
COMMUNITY
Start: 2024-05-15

## 2024-05-17 RX ORDER — TAMSULOSIN HYDROCHLORIDE 0.4 MG/1
1 CAPSULE ORAL
COMMUNITY
Start: 2024-04-11

## 2024-05-20 ENCOUNTER — LAB VISIT (OUTPATIENT)
Dept: LAB | Facility: HOSPITAL | Age: 59
End: 2024-05-20
Payer: COMMERCIAL

## 2024-05-20 DIAGNOSIS — E29.1 HYPOGONADISM IN MALE: ICD-10-CM

## 2024-05-20 DIAGNOSIS — R79.89 LOW TESTOSTERONE IN MALE: ICD-10-CM

## 2024-05-20 LAB
BASOPHILS # BLD AUTO: 0.09 K/UL (ref 0–0.2)
BASOPHILS NFR BLD: 1.4 % (ref 0–1.9)
DIFFERENTIAL METHOD BLD: ABNORMAL
EOSINOPHIL # BLD AUTO: 0 K/UL (ref 0–0.5)
EOSINOPHIL NFR BLD: 0.6 % (ref 0–8)
ERYTHROCYTE [DISTWIDTH] IN BLOOD BY AUTOMATED COUNT: 14 % (ref 11.5–14.5)
HCT VFR BLD AUTO: 50.9 % (ref 40–54)
HGB BLD-MCNC: 17.9 G/DL (ref 14–18)
IMM GRANULOCYTES # BLD AUTO: 0.02 K/UL (ref 0–0.04)
IMM GRANULOCYTES NFR BLD AUTO: 0.3 % (ref 0–0.5)
LYMPHOCYTES # BLD AUTO: 1.9 K/UL (ref 1–4.8)
LYMPHOCYTES NFR BLD: 29.7 % (ref 18–48)
MCH RBC QN AUTO: 31.7 PG (ref 27–31)
MCHC RBC AUTO-ENTMCNC: 35.2 G/DL (ref 32–36)
MCV RBC AUTO: 90 FL (ref 82–98)
MONOCYTES # BLD AUTO: 0.6 K/UL (ref 0.3–1)
MONOCYTES NFR BLD: 9 % (ref 4–15)
NEUTROPHILS # BLD AUTO: 3.8 K/UL (ref 1.8–7.7)
NEUTROPHILS NFR BLD: 59 % (ref 38–73)
NRBC BLD-RTO: 0 /100 WBC
PLATELET # BLD AUTO: 144 K/UL (ref 150–450)
PMV BLD AUTO: 12.6 FL (ref 9.2–12.9)
PROSTATE SPECIFIC ANTIGEN, TOTAL: 0.98 NG/ML (ref 0–4)
PSA FREE MFR SERPL: 28.57 %
PSA FREE SERPL-MCNC: 0.28 NG/ML (ref 0–1.5)
RBC # BLD AUTO: 5.64 M/UL (ref 4.6–6.2)
TESTOST SERPL-MCNC: 910 NG/DL (ref 304–1227)
WBC # BLD AUTO: 6.36 K/UL (ref 3.9–12.7)

## 2024-05-20 PROCEDURE — 85025 COMPLETE CBC W/AUTO DIFF WBC: CPT

## 2024-05-20 PROCEDURE — 84403 ASSAY OF TOTAL TESTOSTERONE: CPT

## 2024-05-20 PROCEDURE — 36415 COLL VENOUS BLD VENIPUNCTURE: CPT

## 2024-05-20 PROCEDURE — 84154 ASSAY OF PSA FREE: CPT

## 2024-05-21 ENCOUNTER — PATIENT MESSAGE (OUTPATIENT)
Dept: UROLOGY | Facility: CLINIC | Age: 59
End: 2024-05-21
Payer: COMMERCIAL

## 2024-05-21 DIAGNOSIS — R79.89 LOW TESTOSTERONE IN MALE: Primary | ICD-10-CM

## 2024-05-21 DIAGNOSIS — E29.1 HYPOGONADISM IN MALE: ICD-10-CM

## 2024-08-07 ENCOUNTER — PATIENT MESSAGE (OUTPATIENT)
Dept: UROLOGY | Facility: CLINIC | Age: 59
End: 2024-08-07
Payer: COMMERCIAL

## 2024-08-08 DIAGNOSIS — E29.1 HYPOGONADISM IN MALE: ICD-10-CM

## 2024-08-08 RX ORDER — TESTOSTERONE CYPIONATE 200 MG/ML
200 INJECTION, SOLUTION INTRAMUSCULAR
Qty: 13 ML | Refills: 0 | Status: SHIPPED | OUTPATIENT
Start: 2024-08-08 | End: 2025-02-06

## 2024-11-13 ENCOUNTER — PATIENT MESSAGE (OUTPATIENT)
Dept: UROLOGY | Facility: CLINIC | Age: 59
End: 2024-11-13
Payer: COMMERCIAL

## 2024-11-13 ENCOUNTER — TELEPHONE (OUTPATIENT)
Dept: UROLOGY | Facility: CLINIC | Age: 59
End: 2024-11-13
Payer: COMMERCIAL

## 2024-11-13 DIAGNOSIS — E29.1 HYPOGONADISM IN MALE: Primary | ICD-10-CM

## 2024-11-13 NOTE — TELEPHONE ENCOUNTER
----- Message from Yazmin sent at 11/12/2024  8:45 AM CST -----  Type: Lab Orders     Who Called: Pt   Would the patient rather a call back or a response via [facility]? Call back   Best Call Back Number: [phone]  Additional Information: Please be advised, pt states that he needs to schedule non fasting labs prior to upcoming appt on 11/22 and would like orders to be sent to [facility] [phone], pt would like a call back once it been sent over to them

## 2024-11-13 NOTE — TELEPHONE ENCOUNTER
Spoke with patient and informed the labs order where fax over to [facility] at [phone]. Patient voice his understanding.

## 2024-11-22 ENCOUNTER — OFFICE VISIT (OUTPATIENT)
Dept: UROLOGY | Facility: CLINIC | Age: 59
End: 2024-11-22
Payer: COMMERCIAL

## 2024-11-22 VITALS
DIASTOLIC BLOOD PRESSURE: 80 MMHG | HEIGHT: 70 IN | HEART RATE: 67 BPM | BODY MASS INDEX: 32.46 KG/M2 | WEIGHT: 226.75 LBS | SYSTOLIC BLOOD PRESSURE: 126 MMHG

## 2024-11-22 DIAGNOSIS — R71.8 ELEVATED HEMATOCRIT: Primary | ICD-10-CM

## 2024-11-22 DIAGNOSIS — E29.1 HYPOGONADISM IN MALE: ICD-10-CM

## 2024-11-22 PROCEDURE — 99999 PR PBB SHADOW E&M-EST. PATIENT-LVL III: CPT | Mod: PBBFAC,,,

## 2024-11-22 RX ORDER — TESTOSTERONE CYPIONATE 200 MG/ML
100 INJECTION, SOLUTION INTRAMUSCULAR
Qty: 13 ML | Refills: 0 | Status: CANCELLED | OUTPATIENT
Start: 2024-11-22 | End: 2025-05-23

## 2024-11-22 NOTE — PROGRESS NOTES
Subjective:       Patient ID: Umair Ascencio is a 59 y.o. male.    Chief Complaint: TRT     This is a 59 y.o.  male patient that is an established patient of mine. This is a patient of Dr. Garcia that is now establishing care with me for TRT and ED. Reports low energy and decreased libido. Testosterone cypionate 1ml q14 days has been improving his symptoms. Reports that his energy level is better as well as his libido but he finds that his symptoms are worse towards the end of the 14 days.  11/22/24-- Here today for 6 month f/u. Reports injecting 0.5cc of testosterone cypionate q 7 days. This is helping with his energy levels and decreased libido.  Labs were done at outside facility:  Total testosterone--908  H&H--15/55.7  PSA--0.88      Lab Results   Component Value Date    TOTALTESTOST 910 05/20/2024       Lab Results   Component Value Date    BUN 18 02/15/2024    CREATININE 0.95 02/15/2024    WBC 6.36 05/20/2024    HGB 17.9 05/20/2024    HCT 50.9 05/20/2024     (L) 05/20/2024    AST 25 08/04/2023    ALT 31 08/04/2023    ALKPHOS 85 08/04/2023    ALBUMIN 4.4 08/04/2023       LAST PSA  Lab Results   Component Value Date    PSADIAG 0.64 08/09/2023    PSATOTAL 0.98 05/20/2024    PSATOTAL 1.1 01/02/2024    PSAFREE 0.28 05/20/2024    PSAFREE 0.22 01/02/2024       Lab Results   Component Value Date    CREATININE 0.95 02/15/2024       ---  PMH/PSH/Medications/Allergies/Social history reviewed and as in chart.    Review of Systems   Constitutional:  Negative for activity change, chills and fever.   Respiratory:  Negative for shortness of breath.    Cardiovascular:  Negative for chest pain and palpitations.   Gastrointestinal:  Negative for abdominal pain and constipation.   Genitourinary:  Negative for difficulty urinating, dysuria, flank pain, frequency, hematuria and urgency.   Neurological:  Negative for dizziness and light-headedness.       Objective:      Physical Exam  HENT:      Head: Normocephalic.    Pulmonary:      Effort: Pulmonary effort is normal.   Musculoskeletal:         General: Normal range of motion.      Cervical back: Normal range of motion.   Skin:     General: Skin is warm and dry.   Neurological:      Mental Status: He is alert and oriented to person, place, and time.         Assessment:     Problem Noted   Elevated Hematocrit 11/22/2024   Hypogonadism in Male 5/17/2024       Plan:     Reviewed recent labs. Hematocrit elevated at 55.7. Discussed the risks to elevated hematocrit and being on TRT. Patient voiced understanding.  Advised to stop testosterone injections.  Repeat CBC in 1 month  Referral placed to hematology for recs on therapeutic phlebotomy.  Follow-up 1 month after CBC lab to review further.    NIRAJ Suresh    I spent a total of 25 minutes on the day of the visit.This includes face to face time and non-face to face time preparing to see the patient (eg, review of tests), obtaining and/or reviewing separately obtained history, documenting clinical information in the electronic or other health record, independently interpreting results and communicating results to the patient/family/caregiver, or care coordinator.

## 2024-12-02 ENCOUNTER — TELEPHONE (OUTPATIENT)
Dept: HEMATOLOGY/ONCOLOGY | Facility: CLINIC | Age: 59
End: 2024-12-02
Payer: COMMERCIAL

## 2024-12-02 ENCOUNTER — PATIENT MESSAGE (OUTPATIENT)
Dept: UROLOGY | Facility: CLINIC | Age: 59
End: 2024-12-02
Payer: COMMERCIAL

## 2024-12-20 ENCOUNTER — PATIENT MESSAGE (OUTPATIENT)
Dept: UROLOGY | Facility: CLINIC | Age: 59
End: 2024-12-20
Payer: COMMERCIAL

## 2024-12-23 ENCOUNTER — PATIENT MESSAGE (OUTPATIENT)
Dept: UROLOGY | Facility: CLINIC | Age: 59
End: 2024-12-23
Payer: COMMERCIAL

## 2025-01-07 ENCOUNTER — PATIENT MESSAGE (OUTPATIENT)
Dept: UROLOGY | Facility: CLINIC | Age: 60
End: 2025-01-07
Payer: COMMERCIAL

## 2025-01-07 ENCOUNTER — TELEPHONE (OUTPATIENT)
Dept: UROLOGY | Facility: CLINIC | Age: 60
End: 2025-01-07
Payer: COMMERCIAL

## 2025-01-07 NOTE — TELEPHONE ENCOUNTER
Return patient call no answer left message to inform that the office receive labs results and the NP will review on 1/8/.

## 2025-01-07 NOTE — TELEPHONE ENCOUNTER
----- Message from Seth sent at 1/7/2025  3:20 PM CST -----  .Type:  Needs Medical Advice    Who Called: pt    Would the patient rather a call back or a response via [facility]? Call back  Best Call Back Number: [phone]  Additional Information:     Pt stated the fax is being sent for his lab results

## 2025-01-09 ENCOUNTER — PATIENT MESSAGE (OUTPATIENT)
Dept: UROLOGY | Facility: CLINIC | Age: 60
End: 2025-01-09
Payer: COMMERCIAL

## 2025-01-17 ENCOUNTER — OFFICE VISIT (OUTPATIENT)
Dept: HEMATOLOGY/ONCOLOGY | Facility: CLINIC | Age: 60
End: 2025-01-17
Payer: COMMERCIAL

## 2025-01-17 VITALS
TEMPERATURE: 98 F | SYSTOLIC BLOOD PRESSURE: 94 MMHG | HEART RATE: 72 BPM | HEIGHT: 70 IN | WEIGHT: 223.75 LBS | RESPIRATION RATE: 20 BRPM | BODY MASS INDEX: 32.03 KG/M2 | OXYGEN SATURATION: 95 % | DIASTOLIC BLOOD PRESSURE: 63 MMHG

## 2025-01-17 DIAGNOSIS — Z79.890 LONG-TERM CURRENT USE OF TESTOSTERONE REPLACEMENT THERAPY: ICD-10-CM

## 2025-01-17 DIAGNOSIS — E29.1 HYPOGONADISM IN MALE: ICD-10-CM

## 2025-01-17 DIAGNOSIS — R71.8 ELEVATED HEMATOCRIT: Primary | ICD-10-CM

## 2025-01-17 PROCEDURE — 3008F BODY MASS INDEX DOCD: CPT | Mod: CPTII,S$GLB,, | Performed by: PHYSICIAN ASSISTANT

## 2025-01-17 PROCEDURE — 3074F SYST BP LT 130 MM HG: CPT | Mod: CPTII,S$GLB,, | Performed by: PHYSICIAN ASSISTANT

## 2025-01-17 PROCEDURE — 99203 OFFICE O/P NEW LOW 30 MIN: CPT | Mod: S$GLB,,, | Performed by: PHYSICIAN ASSISTANT

## 2025-01-17 PROCEDURE — 99999 PR PBB SHADOW E&M-EST. PATIENT-LVL IV: CPT | Mod: PBBFAC,,, | Performed by: PHYSICIAN ASSISTANT

## 2025-01-17 PROCEDURE — 3078F DIAST BP <80 MM HG: CPT | Mod: CPTII,S$GLB,, | Performed by: PHYSICIAN ASSISTANT

## 2025-01-17 PROCEDURE — 1159F MED LIST DOCD IN RCRD: CPT | Mod: CPTII,S$GLB,, | Performed by: PHYSICIAN ASSISTANT

## 2025-01-17 NOTE — PROGRESS NOTES
HEMATOLOGY/ONCOLOGY CLINIC VISIT NOTE    PATIENT: Umair Ascencio  MRN: [id number]  DATE: 1/17/2025    Chief Complaint: hypogonadism and elevated hematocrit     Subjective:    History of Present Illness: Mr. Ascencio is a 60 y.o. male who presents for evaluation and management of elevated hematocrit in setting of hypogonadism on testosterone therapy. His past medical history includes h/o CABG, SAQIB, T2DM, and HLD. He is currently on monjuaro for his diabetes and follows with Dr. Blancas at [facility] for cardiology.     Pt is seen by urology for hypogonadism, previously on testosterone replacement therapy. He was found to have elevated H&H in November 2024 (18 g/dL/55%, respectively), so TRT was discontinued and he was referred to hematology. His last dose of testosterone was in December. He reports feeling more tired since stopping. Denies fever, abdominal pain, chest pain, SOB, pallor, dizziness, headaches, unintentional weight loss, or night sweats.     No family history of cancer, bleeding, or clotting disorders reported. He does not smoke cigarettes.     Past medical, surgical, family, and social histories have been reviewed and updated below.    Past Medical History: No past medical history on file.    Past Surgical History: No past surgical history on file.    Family History: No family history on file.    Social History:  reports that he has never smoked. He has never been exposed to tobacco smoke. He has never used smokeless tobacco. He reports that he does not currently use alcohol. He reports that he does not currently use drugs.    Allergies:  Review of patient's allergies indicates:  No Known Allergies    Medications:  Current Outpatient Medications   Medication Sig Dispense Refill    amLODIPine (NORVASC) 10 MG tablet Take 10 mg by mouth once daily.      aspirin 325 MG tablet Take 325 mg by mouth once daily.      lisinopriL (PRINIVIL,ZESTRIL) 40 MG tablet Take 40 mg by mouth.      metoprolol succinate (TOPROL-XL)  "100 MG 24 hr tablet Take 100 mg by mouth once daily.      MOUNJARO 2.5 mg/0.5 mL PnIj Inject into the skin.      rosuvastatin (CRESTOR) 40 MG Tab Take 40 mg by mouth.      tadalafiL (CIALIS) 20 MG Tab Take by mouth.      tamsulosin (FLOMAX) 0.4 mg Cap Take 1 capsule by mouth.      testosterone cypionate (DEPOTESTOTERONE CYPIONATE) 200 mg/mL injection Inject 1 mL (200 mg total) into the muscle every 14 (fourteen) days. 13 mL 0     No current facility-administered medications for this visit.       Review of Systems   Constitutional:  Negative for diaphoresis, fatigue, fever and unexpected weight change.   HENT:  Negative for nosebleeds.    Respiratory:  Negative for shortness of breath.    Cardiovascular:  Negative for chest pain.   Gastrointestinal:  Negative for blood in stool.   Genitourinary:  Negative for hematuria.   Musculoskeletal:  Negative for myalgias.   Neurological:  Negative for dizziness and headaches.   Hematological:  Does not bruise/bleed easily.       ECOG Performance Status:   ECOG SCORE    0 - Fully active-able to carry on all pre-disease performance without restriction         Objective:      Vitals:   Vitals:    01/17/25 1302   BP: 94/63   BP Location: Left arm   Patient Position: Sitting   Pulse: 72   Resp: 20   Temp: 98.3 °F (36.8 °C)   TempSrc: Oral   SpO2: 95%   Weight: 101.5 kg (223 lb 12.3 oz)   Height: 5' 10" (1.778 m)     BMI: Body mass index is 32.11 kg/m².    Physical Exam  Constitutional:       General: He is not in acute distress.  HENT:      Head: Normocephalic.      Nose: Nose normal.   Eyes:      General: No scleral icterus.  Cardiovascular:      Rate and Rhythm: Normal rate.   Pulmonary:      Effort: Pulmonary effort is normal. No respiratory distress.   Skin:     Capillary Refill: Capillary refill takes less than 2 seconds.      Coloration: Skin is not jaundiced or pale.      Findings: No bruising.   Neurological:      General: No focal deficit present.      Mental Status: He is " alert and oriented to person, place, and time.         Laboratory Data:  Labs have been reviewed.      Imaging: No new imaging to review      Assessment:       1. Elevated hematocrit    2. Hypogonadism in male    3. Long-term current use of testosterone replacement therapy      Plan:     Testosterone therapy stimulates erythropoiesis by increasing epo production in the kidneys and enhancing bone marrow activity to produce RBCs. This can lead to an increase in hematocrit (> 50%), which subsequently raises the risk for thrombotic events.     Mr. Ascencio's hematocrit level most recently in November was >54%, but he reports just having bloodwork done in early January that was normal. We do not have these records to review. Patient is going to obtain them and send through [employer].     Currently not on testosterone therapy and he is unsure that he wants to continue. Patient will reach out to us if he chooses to continue. Should his hematocrit level exceed 54%, we can consider a therapeutic phlebotomy sessions to help decrease this. We can continue to monitor his blood counts every 3-6 months if he chooses continue his testosterone therapy.     I spent a total of 39 minutes on the day of the visit.This includes face to face time and non-face to face time preparing to see the patient (eg, review of tests), obtaining and/or reviewing separately obtained history, documenting clinical information in the electronic or other health record, independently interpreting results and communicating results to the patient/family/caregiver, or care coordinator.     Licha Bartholomew PA-C   Hematology/Oncology  [facility] [address], [address]  [place] [address]  Phone: [phone]  Fax: [phone]

## 2025-01-20 ENCOUNTER — PATIENT MESSAGE (OUTPATIENT)
Dept: HEMATOLOGY/ONCOLOGY | Facility: CLINIC | Age: 60
End: 2025-01-20
Payer: COMMERCIAL

## 2025-01-24 DIAGNOSIS — R71.8 ELEVATED HEMATOCRIT: Primary | ICD-10-CM

## 2025-01-24 NOTE — TELEPHONE ENCOUNTER
Therapeutic phlebotomy x 1 next week. Labs the day or two after wherever he wants them done. Orders are in -- or will be in 2 minutes

## 2025-01-29 ENCOUNTER — PATIENT MESSAGE (OUTPATIENT)
Dept: HEMATOLOGY/ONCOLOGY | Facility: CLINIC | Age: 60
End: 2025-01-29
Payer: COMMERCIAL

## 2025-02-05 ENCOUNTER — TELEPHONE (OUTPATIENT)
Dept: HEMATOLOGY/ONCOLOGY | Facility: CLINIC | Age: 60
End: 2025-02-05
Payer: COMMERCIAL

## 2025-02-05 ENCOUNTER — PATIENT MESSAGE (OUTPATIENT)
Dept: UROLOGY | Facility: CLINIC | Age: 60
End: 2025-02-05
Payer: COMMERCIAL

## 2025-02-05 DIAGNOSIS — E29.1 HYPOGONADISM IN MALE: ICD-10-CM

## 2025-02-05 RX ORDER — TESTOSTERONE CYPIONATE 200 MG/ML
200 INJECTION, SOLUTION INTRAMUSCULAR
Qty: 13 ML | Refills: 0 | Status: SHIPPED | OUTPATIENT
Start: 2025-02-05 | End: 2025-02-20 | Stop reason: SDUPTHER

## 2025-02-05 RX ORDER — TESTOSTERONE CYPIONATE 200 MG/ML
200 INJECTION, SOLUTION INTRAMUSCULAR
Qty: 13 ML | Refills: 0 | Status: SHIPPED | OUTPATIENT
Start: 2025-02-05 | End: 2025-02-05

## 2025-02-11 ENCOUNTER — PATIENT MESSAGE (OUTPATIENT)
Dept: HEMATOLOGY/ONCOLOGY | Facility: CLINIC | Age: 60
End: 2025-02-11
Payer: COMMERCIAL

## 2025-02-14 ENCOUNTER — PATIENT MESSAGE (OUTPATIENT)
Dept: UROLOGY | Facility: CLINIC | Age: 60
End: 2025-02-14
Payer: COMMERCIAL

## 2025-02-17 ENCOUNTER — PATIENT MESSAGE (OUTPATIENT)
Dept: UROLOGY | Facility: CLINIC | Age: 60
End: 2025-02-17
Payer: COMMERCIAL

## 2025-02-20 DIAGNOSIS — E29.1 HYPOGONADISM IN MALE: ICD-10-CM

## 2025-02-20 RX ORDER — TESTOSTERONE CYPIONATE 200 MG/ML
200 INJECTION, SOLUTION INTRAMUSCULAR
Qty: 13 ML | Refills: 0 | Status: SHIPPED | OUTPATIENT
Start: 2025-02-20 | End: 2025-08-21

## 2025-03-05 ENCOUNTER — LAB VISIT (OUTPATIENT)
Dept: LAB | Facility: HOSPITAL | Age: 60
End: 2025-03-05
Payer: COMMERCIAL

## 2025-03-05 DIAGNOSIS — R71.8 ELEVATED HEMATOCRIT: ICD-10-CM

## 2025-03-05 LAB
BASOPHILS # BLD AUTO: 0.1 K/UL (ref 0–0.2)
BASOPHILS NFR BLD: 1.2 % (ref 0–1.9)
DIFFERENTIAL METHOD BLD: ABNORMAL
EOSINOPHIL # BLD AUTO: 0 K/UL (ref 0–0.5)
EOSINOPHIL NFR BLD: 0 % (ref 0–8)
ERYTHROCYTE [DISTWIDTH] IN BLOOD BY AUTOMATED COUNT: 14.6 % (ref 11.5–14.5)
FERRITIN SERPL-MCNC: 249 NG/ML (ref 20–300)
HCT VFR BLD AUTO: 44.5 % (ref 40–54)
HGB BLD-MCNC: 15.4 G/DL (ref 14–18)
IMM GRANULOCYTES # BLD AUTO: 0.06 K/UL (ref 0–0.04)
IMM GRANULOCYTES NFR BLD AUTO: 0.7 % (ref 0–0.5)
IRON SERPL-MCNC: 114 UG/DL (ref 45–160)
LYMPHOCYTES # BLD AUTO: 2 K/UL (ref 1–4.8)
LYMPHOCYTES NFR BLD: 23.4 % (ref 18–48)
MCH RBC QN AUTO: 32.2 PG (ref 27–31)
MCHC RBC AUTO-ENTMCNC: 34.6 G/DL (ref 32–36)
MCV RBC AUTO: 93 FL (ref 82–98)
MONOCYTES # BLD AUTO: 0.7 K/UL (ref 0.3–1)
MONOCYTES NFR BLD: 8.4 % (ref 4–15)
NEUTROPHILS # BLD AUTO: 5.7 K/UL (ref 1.8–7.7)
NEUTROPHILS NFR BLD: 66.3 % (ref 38–73)
NRBC BLD-RTO: 0 /100 WBC
PLATELET # BLD AUTO: 145 K/UL (ref 150–450)
PMV BLD AUTO: 11.5 FL (ref 9.2–12.9)
RBC # BLD AUTO: 4.78 M/UL (ref 4.6–6.2)
SATURATED IRON: 29 % (ref 20–50)
TOTAL IRON BINDING CAPACITY: 395 UG/DL (ref 250–450)
TRANSFERRIN SERPL-MCNC: 267 MG/DL (ref 200–375)
WBC # BLD AUTO: 8.55 K/UL (ref 3.9–12.7)

## 2025-03-05 PROCEDURE — 83540 ASSAY OF IRON: CPT | Performed by: PHYSICIAN ASSISTANT

## 2025-03-05 PROCEDURE — 36415 COLL VENOUS BLD VENIPUNCTURE: CPT | Performed by: PHYSICIAN ASSISTANT

## 2025-03-05 PROCEDURE — 85025 COMPLETE CBC W/AUTO DIFF WBC: CPT | Performed by: PHYSICIAN ASSISTANT

## 2025-03-05 PROCEDURE — 82728 ASSAY OF FERRITIN: CPT | Performed by: PHYSICIAN ASSISTANT

## 2025-03-06 ENCOUNTER — OFFICE VISIT (OUTPATIENT)
Dept: HEMATOLOGY/ONCOLOGY | Facility: CLINIC | Age: 60
End: 2025-03-06
Payer: COMMERCIAL

## 2025-03-06 DIAGNOSIS — R71.8 ELEVATED HEMATOCRIT: Primary | ICD-10-CM

## 2025-03-06 DIAGNOSIS — Z79.890 LONG-TERM CURRENT USE OF TESTOSTERONE REPLACEMENT THERAPY: ICD-10-CM

## 2025-03-06 DIAGNOSIS — E29.1 HYPOGONADISM IN MALE: ICD-10-CM

## 2025-03-06 NOTE — PROGRESS NOTES
HEMATOLOGY/ONCOLOGY VIRTUAL VISIT NOTE    PATIENT: Umair Ascencio  MRN: [id number]  DATE: 3/6/2025    Chief Complaint: hypogonadism and elevated hematocrit     The patient location is: work ([place])    Visit type: audiovisual    Face to Face time with patient: 5  15 minutes of total time spent on the encounter, which includes face to face time and non-face to face time preparing to see the patient (eg, review of tests), Obtaining and/or reviewing separately obtained history, Documenting clinical information in the electronic or other health record, Independently interpreting results (not separately reported) and communicating results to the patient/family/caregiver, or Care coordination (not separately reported).       Each patient to whom he or she provides medical services by telemedicine is:  (1) informed of the relationship between the physician and patient and the respective role of any other health care provider with respect to management of the patient; and (2) notified that he or she may decline to receive medical services by telemedicine and may withdraw from such care at any time.      Subjective:    History of Present Illness: Mr. Ascencio is a 60 y.o. male who presented for evaluation and management of elevated hematocrit in setting of hypogonadism on testosterone therapy. His past medical history includes h/o CABG, SAQIB, T2DM, and HLD. He is currently on monjuaro for his diabetes and follows with Dr. Blancas at [facility] for cardiology.     Pt is seen by urology for hypogonadism, previously on testosterone replacement therapy. He was found to have elevated H&H in November 2024 (18 g/dL/55%, respectively), so TRT was discontinued and he was referred to hematology.     No family history of cancer, bleeding, or clotting disorders reported. He does not smoke cigarettes.     Interval Hx 3/6/2025:  - His labs normalized after initial clinic visit (off testosterone therapy - no phlebotomy was needed)  - He has  restarted therapy so we are continuing to monitor labs and will order phlebotomy as needed.  - Seen in ED 2/24 for chest pain x 2 weeks, no acute findings. Has close follow up with cardiology (due to his h/o failed stent and need for CABG) and has stress test scheduled for next week.   - Has restarted testosterone 2 weeks ago.      Medications:  Current Outpatient Medications   Medication Sig Dispense Refill    amLODIPine (NORVASC) 10 MG tablet Take 10 mg by mouth once daily.      aspirin 325 MG tablet Take 325 mg by mouth once daily.      lisinopriL (PRINIVIL,ZESTRIL) 40 MG tablet Take 40 mg by mouth.      metoprolol succinate (TOPROL-XL) 100 MG 24 hr tablet Take 100 mg by mouth once daily.      MOUNJARO 2.5 mg/0.5 mL PnIj Inject into the skin.      rosuvastatin (CRESTOR) 40 MG Tab Take 40 mg by mouth.      tadalafiL (CIALIS) 20 MG Tab Take by mouth.      tamsulosin (FLOMAX) 0.4 mg Cap Take 1 capsule by mouth.      testosterone cypionate (DEPOTESTOTERONE CYPIONATE) 200 mg/mL injection Inject 1 mL (200 mg total) into the muscle every 14 (fourteen) days. 13 mL 0     No current facility-administered medications for this visit.       Review of Systems   Constitutional:  Negative for diaphoresis, fatigue, fever and unexpected weight change.   HENT:  Negative for nosebleeds.    Respiratory:  Negative for shortness of breath.    Cardiovascular:  Negative for chest pain.   Gastrointestinal:  Negative for blood in stool.   Genitourinary:  Negative for hematuria.   Musculoskeletal:  Negative for myalgias.   Neurological:  Negative for dizziness and headaches.   Hematological:  Does not bruise/bleed easily.       ECOG Performance Status:   ECOG SCORE    0 - Fully active-able to carry on all pre-disease performance without restriction         Objective:     No vitals or physical exam - virtual visit     Laboratory Data:  Labs have been reviewed.      Imaging: No new imaging to review      Assessment:       1. Elevated  hematocrit    2. Hypogonadism in male    3. Long-term current use of testosterone replacement therapy        Plan:     Testosterone therapy stimulates erythropoiesis by increasing epo production in the kidneys and enhancing bone marrow activity to produce RBCs. This can lead to an increase in hematocrit (> 50%), which subsequently raises the risk for thrombotic events.     Mr. Ascencio's hematocrit level most recently was normal. We will continue to monitor every 3-6 months now that he is back on testosterone therapy and set up therapeutic phlebotomy as needed (hct > 54%)    Next lab set in 2 months (call with results), follow up in August with labs prior.     Licha Bartholomew PA-C   Hematology/Oncology  [facility] - [address], [address]  [place] [address]  Phone: [phone]  Fax: [phone]

## 2025-05-05 ENCOUNTER — RESULTS FOLLOW-UP (OUTPATIENT)
Dept: HEMATOLOGY/ONCOLOGY | Facility: CLINIC | Age: 60
End: 2025-05-05

## 2025-05-05 ENCOUNTER — LAB VISIT (OUTPATIENT)
Dept: LAB | Facility: HOSPITAL | Age: 60
End: 2025-05-05
Attending: PHYSICIAN ASSISTANT
Payer: COMMERCIAL

## 2025-05-05 DIAGNOSIS — R71.8 ELEVATED HEMATOCRIT: ICD-10-CM

## 2025-05-05 DIAGNOSIS — Z79.890 LONG-TERM CURRENT USE OF TESTOSTERONE REPLACEMENT THERAPY: ICD-10-CM

## 2025-05-05 LAB
ABSOLUTE EOSINOPHIL (OHS): 0.15 K/UL
ABSOLUTE MONOCYTE (OHS): 0.39 K/UL (ref 0.3–1)
ABSOLUTE NEUTROPHIL COUNT (OHS): 3.7 K/UL (ref 1.8–7.7)
BASOPHILS # BLD AUTO: 0.08 K/UL
BASOPHILS NFR BLD AUTO: 1.3 %
ERYTHROCYTE [DISTWIDTH] IN BLOOD BY AUTOMATED COUNT: 12.7 % (ref 11.5–14.5)
FERRITIN SERPL-MCNC: 58.4 NG/ML (ref 20–300)
HCT VFR BLD AUTO: 54.2 % (ref 40–54)
HGB BLD-MCNC: 18 GM/DL (ref 14–18)
IMM GRANULOCYTES # BLD AUTO: 0.04 K/UL (ref 0–0.04)
IMM GRANULOCYTES NFR BLD AUTO: 0.7 % (ref 0–0.5)
IRON SATN MFR SERPL: 23 % (ref 20–50)
IRON SERPL-MCNC: 91 UG/DL (ref 45–160)
LYMPHOCYTES # BLD AUTO: 1.69 K/UL (ref 1–4.8)
MCH RBC QN AUTO: 31.2 PG (ref 27–31)
MCHC RBC AUTO-ENTMCNC: 33.2 G/DL (ref 32–36)
MCV RBC AUTO: 94 FL (ref 82–98)
NUCLEATED RBC (/100WBC) (OHS): 0 /100 WBC
PLATELET # BLD AUTO: 148 K/UL (ref 150–450)
PMV BLD AUTO: 11.6 FL (ref 9.2–12.9)
RBC # BLD AUTO: 5.77 M/UL (ref 4.6–6.2)
RELATIVE EOSINOPHIL (OHS): 2.5 %
RELATIVE LYMPHOCYTE (OHS): 27.9 % (ref 18–48)
RELATIVE MONOCYTE (OHS): 6.4 % (ref 4–15)
RELATIVE NEUTROPHIL (OHS): 61.2 % (ref 38–73)
TIBC SERPL-MCNC: 398 UG/DL (ref 250–450)
TRANSFERRIN SERPL-MCNC: 269 MG/DL (ref 200–375)
WBC # BLD AUTO: 6.05 K/UL (ref 3.9–12.7)

## 2025-05-05 PROCEDURE — 82728 ASSAY OF FERRITIN: CPT

## 2025-05-05 PROCEDURE — 85025 COMPLETE CBC W/AUTO DIFF WBC: CPT

## 2025-05-05 PROCEDURE — 83540 ASSAY OF IRON: CPT

## 2025-05-05 PROCEDURE — 36415 COLL VENOUS BLD VENIPUNCTURE: CPT

## 2025-05-06 ENCOUNTER — TELEPHONE (OUTPATIENT)
Dept: HEMATOLOGY/ONCOLOGY | Facility: CLINIC | Age: 60
End: 2025-05-06
Payer: COMMERCIAL

## 2025-05-06 NOTE — TELEPHONE ENCOUNTER
Spoke to patient regarding lab results. He is back on testosterone. Needs phlebotomy scheduled. H&H is 18/54.2    Will send in new document to blood bank and he will schedule phlebotomy. Follow up with labs 2-3 weeks after.     Licha Bartholomew PA-C   5/6/2025 8:48 AM

## 2025-05-06 NOTE — TELEPHONE ENCOUNTER
Called. Need to send a new blood bank document to the [place] with new Hgb and Hematocrit parameters. Schedule follow up with labs in 2-3 weeks (after phlebotomy).

## 2025-07-07 ENCOUNTER — PATIENT MESSAGE (OUTPATIENT)
Dept: HEMATOLOGY/ONCOLOGY | Facility: CLINIC | Age: 60
End: 2025-07-07
Payer: COMMERCIAL

## 2025-07-08 DIAGNOSIS — Z71.3 WEIGHT LOSS COUNSELING, ENCOUNTER FOR: Primary | ICD-10-CM

## 2025-08-04 ENCOUNTER — LAB VISIT (OUTPATIENT)
Dept: LAB | Facility: HOSPITAL | Age: 60
End: 2025-08-04
Attending: PHYSICIAN ASSISTANT
Payer: COMMERCIAL

## 2025-08-04 DIAGNOSIS — R71.8 ELEVATED HEMATOCRIT: ICD-10-CM

## 2025-08-04 DIAGNOSIS — Z79.890 LONG-TERM CURRENT USE OF TESTOSTERONE REPLACEMENT THERAPY: ICD-10-CM

## 2025-08-04 LAB
ABSOLUTE EOSINOPHIL (OHS): 0.04 K/UL
ABSOLUTE MONOCYTE (OHS): 0.61 K/UL (ref 0.3–1)
ABSOLUTE NEUTROPHIL COUNT (OHS): 4.16 K/UL (ref 1.8–7.7)
BASOPHILS # BLD AUTO: 0.11 K/UL
BASOPHILS NFR BLD AUTO: 1.6 %
ERYTHROCYTE [DISTWIDTH] IN BLOOD BY AUTOMATED COUNT: 15.3 % (ref 11.5–14.5)
FERRITIN SERPL-MCNC: 35.6 NG/ML (ref 20–300)
HCT VFR BLD AUTO: 58.6 % (ref 40–54)
HGB BLD-MCNC: 19.2 GM/DL (ref 14–18)
IMM GRANULOCYTES # BLD AUTO: 0.02 K/UL (ref 0–0.04)
IMM GRANULOCYTES NFR BLD AUTO: 0.3 % (ref 0–0.5)
IRON SATN MFR SERPL: 19 % (ref 20–50)
IRON SERPL-MCNC: 87 UG/DL (ref 45–160)
LYMPHOCYTES # BLD AUTO: 1.81 K/UL (ref 1–4.8)
MCH RBC QN AUTO: 30.2 PG (ref 27–31)
MCHC RBC AUTO-ENTMCNC: 32.8 G/DL (ref 32–36)
MCV RBC AUTO: 92 FL (ref 82–98)
NUCLEATED RBC (/100WBC) (OHS): 0 /100 WBC
PLATELET # BLD AUTO: 151 K/UL (ref 150–450)
PMV BLD AUTO: 12.2 FL (ref 9.2–12.9)
RBC # BLD AUTO: 6.36 M/UL (ref 4.6–6.2)
RELATIVE EOSINOPHIL (OHS): 0.6 %
RELATIVE LYMPHOCYTE (OHS): 26.8 % (ref 18–48)
RELATIVE MONOCYTE (OHS): 9 % (ref 4–15)
RELATIVE NEUTROPHIL (OHS): 61.7 % (ref 38–73)
TIBC SERPL-MCNC: 463 UG/DL (ref 250–450)
TRANSFERRIN SERPL-MCNC: 313 MG/DL (ref 200–375)
WBC # BLD AUTO: 6.75 K/UL (ref 3.9–12.7)

## 2025-08-04 PROCEDURE — 85025 COMPLETE CBC W/AUTO DIFF WBC: CPT

## 2025-08-04 PROCEDURE — 84466 ASSAY OF TRANSFERRIN: CPT

## 2025-08-04 PROCEDURE — 82728 ASSAY OF FERRITIN: CPT

## 2025-08-04 PROCEDURE — 36415 COLL VENOUS BLD VENIPUNCTURE: CPT

## 2025-08-06 ENCOUNTER — OFFICE VISIT (OUTPATIENT)
Dept: HEMATOLOGY/ONCOLOGY | Facility: CLINIC | Age: 60
End: 2025-08-06
Payer: COMMERCIAL

## 2025-08-06 DIAGNOSIS — Z79.890 LONG-TERM CURRENT USE OF TESTOSTERONE REPLACEMENT THERAPY: ICD-10-CM

## 2025-08-06 DIAGNOSIS — E29.1 HYPOGONADISM IN MALE: ICD-10-CM

## 2025-08-06 DIAGNOSIS — R71.8 ELEVATED HEMATOCRIT: Primary | ICD-10-CM

## 2025-08-06 NOTE — PROGRESS NOTES
HEMATOLOGY/ONCOLOGY VIRTUAL VISIT NOTE    PATIENT: Umair Ascencio  MRN: [id number]  DATE: 8/6/2025    Chief Complaint: hypogonadism and elevated hematocrit     The patient location is: work ([place])    Visit type: audiovisual    Face to Face time with patient: 5  15 minutes of total time spent on the encounter, which includes face to face time and non-face to face time preparing to see the patient (eg, review of tests), Obtaining and/or reviewing separately obtained history, Documenting clinical information in the electronic or other health record, Independently interpreting results (not separately reported) and communicating results to the patient/family/caregiver, or Care coordination (not separately reported).     Each patient to whom he or she provides medical services by telemedicine is:  (1) informed of the relationship between the physician and patient and the respective role of any other health care provider with respect to management of the patient; and (2) notified that he or she may decline to receive medical services by telemedicine and may withdraw from such care at any time.    Subjective:    History of Present Illness: Mr. Ascencio is a 60 y.o. male who presented for evaluation and management of elevated hematocrit in setting of hypogonadism on testosterone therapy. His past medical history includes h/o CABG, SAQIB, T2DM, and HLD. He is currently on monjuaro for his diabetes and follows with Dr. Blancas at [facility] for cardiology.     Pt is seen by urology for hypogonadism, previously on testosterone replacement therapy. He was found to have elevated H&H in November 2024 (18 g/dL/55%, respectively), so TRT was discontinued and he was referred to hematology.     No family history of cancer, bleeding, or clotting disorders reported. He does not smoke cigarettes.     Interval Hx 8/6/2025:  - Pt restarted testosterone therapy back in March, takes 100mg every week.   - Last phlebotomy was in May 2025  - Feels  fatigued and had some blurry vision. Otherwise denies chest pain, SOB, leg swelling, pain, or any other concerns.     Medications:  Current Outpatient Medications   Medication Sig Dispense Refill    amLODIPine (NORVASC) 10 MG tablet Take 10 mg by mouth once daily.      aspirin 325 MG tablet Take 325 mg by mouth once daily.      lisinopriL (PRINIVIL,ZESTRIL) 40 MG tablet Take 40 mg by mouth.      metoprolol succinate (TOPROL-XL) 100 MG 24 hr tablet Take 100 mg by mouth once daily.      MOUNJARO 2.5 mg/0.5 mL PnIj Inject into the skin.      rosuvastatin (CRESTOR) 40 MG Tab Take 40 mg by mouth.      tadalafiL (CIALIS) 20 MG Tab Take by mouth.      tamsulosin (FLOMAX) 0.4 mg Cap Take 1 capsule by mouth.      testosterone cypionate (DEPOTESTOTERONE CYPIONATE) 200 mg/mL injection Inject 1 mL (200 mg total) into the muscle every 14 (fourteen) days. 13 mL 0     No current facility-administered medications for this visit.       Review of Systems   Constitutional:  Negative for diaphoresis, fatigue, fever and unexpected weight change.   HENT:  Negative for nosebleeds.    Respiratory:  Negative for shortness of breath.    Cardiovascular:  Negative for chest pain.   Gastrointestinal:  Negative for blood in stool.   Genitourinary:  Negative for hematuria.   Musculoskeletal:  Negative for myalgias.   Neurological:  Negative for dizziness and headaches.   Hematological:  Does not bruise/bleed easily.       ECOG Performance Status:   ECOG SCORE    0 - Fully active-able to carry on all pre-disease performance without restriction         Objective:     No vitals or physical exam - virtual visit     Laboratory Data:  Labs have been reviewed.      Imaging: No new imaging to review      Assessment:       1. Elevated hematocrit    2. Hypogonadism in male    3. Long-term current use of testosterone replacement therapy        Plan:     Testosterone therapy stimulates erythropoiesis by increasing epo production in the kidneys and enhancing  bone marrow activity to produce RBCs. This can lead to an increase in hematocrit (> 50%), which subsequently raises the risk for thrombotic events.     Last phlebotomy was May 2025, most recent Hgb was elevated > 54%. Will get phlebotomy this week. Check labs monthly, may need phlebotomy more frequently.     RTC for follow up in 3 months     Licha Bartholomew PA-C   Hematology/Oncology  [facility] - [address], [address]  [place] [address]  Phone: [phone]  Fax: [phone]

## 2025-08-18 ENCOUNTER — PATIENT MESSAGE (OUTPATIENT)
Dept: UROLOGY | Facility: CLINIC | Age: 60
End: 2025-08-18
Payer: COMMERCIAL

## 2025-08-18 ENCOUNTER — PATIENT MESSAGE (OUTPATIENT)
Dept: HEMATOLOGY/ONCOLOGY | Facility: CLINIC | Age: 60
End: 2025-08-18
Payer: COMMERCIAL

## 2025-08-18 DIAGNOSIS — E29.1 HYPOGONADISM IN MALE: Primary | ICD-10-CM

## 2025-08-19 ENCOUNTER — LAB VISIT (OUTPATIENT)
Dept: LAB | Facility: HOSPITAL | Age: 60
End: 2025-08-19
Payer: COMMERCIAL

## 2025-08-19 ENCOUNTER — PATIENT MESSAGE (OUTPATIENT)
Dept: UROLOGY | Facility: CLINIC | Age: 60
End: 2025-08-19
Payer: COMMERCIAL

## 2025-08-19 DIAGNOSIS — Z79.890 LONG-TERM CURRENT USE OF TESTOSTERONE REPLACEMENT THERAPY: ICD-10-CM

## 2025-08-19 DIAGNOSIS — R71.8 ELEVATED HEMATOCRIT: ICD-10-CM

## 2025-08-19 DIAGNOSIS — E29.1 HYPOGONADISM IN MALE: ICD-10-CM

## 2025-08-19 LAB
ABSOLUTE EOSINOPHIL (OHS): 0.03 K/UL
ABSOLUTE MONOCYTE (OHS): 0.49 K/UL (ref 0.3–1)
ABSOLUTE NEUTROPHIL COUNT (OHS): 3.87 K/UL (ref 1.8–7.7)
BASOPHILS # BLD AUTO: 0.07 K/UL
BASOPHILS NFR BLD AUTO: 1.2 %
ERYTHROCYTE [DISTWIDTH] IN BLOOD BY AUTOMATED COUNT: 14.9 % (ref 11.5–14.5)
HCT VFR BLD AUTO: 54.7 % (ref 40–54)
HGB BLD-MCNC: 18.3 GM/DL (ref 14–18)
IMM GRANULOCYTES # BLD AUTO: 0.02 K/UL (ref 0–0.04)
IMM GRANULOCYTES NFR BLD AUTO: 0.3 % (ref 0–0.5)
LYMPHOCYTES # BLD AUTO: 1.6 K/UL (ref 1–4.8)
MCH RBC QN AUTO: 30.7 PG (ref 27–31)
MCHC RBC AUTO-ENTMCNC: 33.5 G/DL (ref 32–36)
MCV RBC AUTO: 92 FL (ref 82–98)
NUCLEATED RBC (/100WBC) (OHS): 0 /100 WBC
PLATELET # BLD AUTO: 158 K/UL (ref 150–450)
PMV BLD AUTO: 12.1 FL (ref 9.2–12.9)
PSA FREE MFR SERPL: 21.9 %
PSA FREE SERPL-MCNC: 0.23 NG/ML
PSA SERPL-MCNC: 1.05 NG/ML
RBC # BLD AUTO: 5.96 M/UL (ref 4.6–6.2)
RELATIVE EOSINOPHIL (OHS): 0.5 %
RELATIVE LYMPHOCYTE (OHS): 26.3 % (ref 18–48)
RELATIVE MONOCYTE (OHS): 8.1 % (ref 4–15)
RELATIVE NEUTROPHIL (OHS): 63.6 % (ref 38–73)
TESTOST SERPL-MCNC: 614 NG/DL (ref 304–1227)
WBC # BLD AUTO: 6.08 K/UL (ref 3.9–12.7)

## 2025-08-19 PROCEDURE — 84403 ASSAY OF TOTAL TESTOSTERONE: CPT

## 2025-08-19 PROCEDURE — 85025 COMPLETE CBC W/AUTO DIFF WBC: CPT

## 2025-08-19 PROCEDURE — 36415 COLL VENOUS BLD VENIPUNCTURE: CPT

## 2025-08-19 PROCEDURE — 84153 ASSAY OF PSA TOTAL: CPT

## 2025-08-20 ENCOUNTER — PATIENT MESSAGE (OUTPATIENT)
Dept: HEMATOLOGY/ONCOLOGY | Facility: CLINIC | Age: 60
End: 2025-08-20
Payer: COMMERCIAL

## 2025-08-20 ENCOUNTER — OFFICE VISIT (OUTPATIENT)
Dept: UROLOGY | Facility: CLINIC | Age: 60
End: 2025-08-20
Payer: COMMERCIAL

## 2025-08-20 DIAGNOSIS — E29.1 HYPOGONADISM IN MALE: Primary | ICD-10-CM

## 2025-08-21 ENCOUNTER — LAB VISIT (OUTPATIENT)
Dept: LAB | Facility: HOSPITAL | Age: 60
End: 2025-08-21
Attending: INTERNAL MEDICINE
Payer: COMMERCIAL

## 2025-08-21 DIAGNOSIS — R71.8 ELEVATED HEMATOCRIT: ICD-10-CM

## 2025-08-21 DIAGNOSIS — Z79.890 LONG-TERM CURRENT USE OF TESTOSTERONE REPLACEMENT THERAPY: ICD-10-CM

## 2025-08-21 LAB
ABSOLUTE EOSINOPHIL (OHS): 0.53 K/UL
ABSOLUTE MONOCYTE (OHS): 0.63 K/UL (ref 0.3–1)
ABSOLUTE NEUTROPHIL COUNT (OHS): 3.72 K/UL (ref 1.8–7.7)
ANISOCYTOSIS BLD QL SMEAR: SLIGHT
BASOPHILS # BLD AUTO: 0.11 K/UL
BASOPHILS NFR BLD AUTO: 1.6 %
DOHLE BOD BLD QL SMEAR: PRESENT
ERYTHROCYTE [DISTWIDTH] IN BLOOD BY AUTOMATED COUNT: 14.9 % (ref 11.5–14.5)
HCT VFR BLD AUTO: 51.8 % (ref 40–54)
HGB BLD-MCNC: 17.1 GM/DL (ref 14–18)
HYPOCHROMIA BLD QL SMEAR: NORMAL
IMM GRANULOCYTES # BLD AUTO: 0.01 K/UL (ref 0–0.04)
IMM GRANULOCYTES NFR BLD AUTO: 0.1 % (ref 0–0.5)
LYMPHOCYTES # BLD AUTO: 1.94 K/UL (ref 1–4.8)
MCH RBC QN AUTO: 30.6 PG (ref 27–31)
MCHC RBC AUTO-ENTMCNC: 33 G/DL (ref 32–36)
MCV RBC AUTO: 93 FL (ref 82–98)
NUCLEATED RBC (/100WBC) (OHS): 0 /100 WBC
PLATELET # BLD AUTO: 158 K/UL (ref 150–450)
PLATELET BLD QL SMEAR: NORMAL
PMV BLD AUTO: 11.4 FL (ref 9.2–12.9)
RBC # BLD AUTO: 5.58 M/UL (ref 4.6–6.2)
RELATIVE EOSINOPHIL (OHS): 7.6 %
RELATIVE LYMPHOCYTE (OHS): 28 % (ref 18–48)
RELATIVE MONOCYTE (OHS): 9.1 % (ref 4–15)
RELATIVE NEUTROPHIL (OHS): 53.6 % (ref 38–73)
TOXIC GRANULES BLD QL SMEAR: PRESENT
WBC # BLD AUTO: 6.94 K/UL (ref 3.9–12.7)

## 2025-08-21 PROCEDURE — 85025 COMPLETE CBC W/AUTO DIFF WBC: CPT

## 2025-08-21 PROCEDURE — 36415 COLL VENOUS BLD VENIPUNCTURE: CPT

## 2025-08-25 ENCOUNTER — PATIENT MESSAGE (OUTPATIENT)
Dept: UROLOGY | Facility: CLINIC | Age: 60
End: 2025-08-25
Payer: COMMERCIAL

## 2025-08-25 DIAGNOSIS — E29.1 HYPOGONADISM IN MALE: Primary | ICD-10-CM

## 2025-08-25 RX ORDER — TESTOSTERONE CYPIONATE 200 MG/ML
100 INJECTION, SOLUTION INTRAMUSCULAR
Qty: 6.5 ML | Refills: 0 | Status: SHIPPED | OUTPATIENT
Start: 2025-08-25 | End: 2026-02-23